# Patient Record
Sex: MALE | Race: BLACK OR AFRICAN AMERICAN | Employment: STUDENT | URBAN - METROPOLITAN AREA
[De-identification: names, ages, dates, MRNs, and addresses within clinical notes are randomized per-mention and may not be internally consistent; named-entity substitution may affect disease eponyms.]

---
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
## 2025-01-02 ENCOUNTER — TELEPHONE (OUTPATIENT)
Dept: PRIMARY CARE CLINIC | Age: 20
End: 2025-01-02

## 2025-01-02 DIAGNOSIS — F90.2 ATTENTION DEFICIT HYPERACTIVITY DISORDER (ADHD), COMBINED TYPE: ICD-10-CM

## 2025-01-02 RX ORDER — DEXMETHYLPHENIDATE HYDROCHLORIDE 40 MG/1
40 CAPSULE, EXTENDED RELEASE ORAL DAILY
Qty: 30 CAPSULE | Refills: 0 | Status: SHIPPED | OUTPATIENT
Start: 2025-01-02 | End: 2025-02-01

## 2025-01-02 NOTE — TELEPHONE ENCOUNTER
[facility] [id number] - [place], [place] - [address] [facility] - P [phone] - F [phone]   [address] [facility] [address]   Phone:  [phone]  Fax:  [phone]       Dexmethylphenidate HCl ER 40 MG CP24 [[id number]]  ENDED

## 2025-01-02 NOTE — TELEPHONE ENCOUNTER
Called patient mother CAROLYN and left detailed vm that  would like patient or parent to call and find a pharmacy that does have dexmethylphenidate 40mg in stock. Last [facility] did not have the 40mg caps in stock and  changed rx to 20mg caps quantity 60 but patient insurance refused to cover the 20mg caps, therefore  requests a pharmacy change for medication. Left detailed vm with office phone number as well as a [employer] message.

## 2025-01-02 NOTE — TELEPHONE ENCOUNTER
Patient mom called in, they are out of the 40mg. Can we go back to the 20mg doubled.     Please advise.

## 2025-01-02 NOTE — TELEPHONE ENCOUNTER
Medication:   Requested Prescriptions     Pending Prescriptions Disp Refills    Dexmethylphenidate HCl ER 40 MG CP24 30 capsule 0     Sig: Take 40 mg by mouth daily for 30 days. Max Daily Amount: 40 mg      Last Filled:  11/20/24  Patient Phone Number: [phone] (home)     Last appt: 9/11/2024   Next appt: 3/12/2025    Last OARRS:        No data to display              PDMP Monitoring:    Last PDMP Master as Reviewed ([place]):  Review User Review Instant Review Result   ELBERT BECERRA 11/19/2024 11:00 AM Reviewed PDMP [1]     Preferred Pharmacy:   [facility] [id number] [place], [place] - [address] [facility] [phone] - F [phone]  [address] [place] [address]  Phone: [phone] Fax: [phone]    Recent Visits  Date Type Provider Dept   09/11/24 Office Visit Elbert Becerra MD [facility]   01/23/24 Office Visit Elbert Becerra MD [facility]   Showing recent visits within past 540 days with a meds authorizing provider and meeting all other requirements  Future Appointments  Date Type Provider Dept   03/12/25 Appointment Elbert Becerra MD [facility]   Showing future appointments within next 150 days with a meds authorizing provider and meeting all other requirements     9/11/2024

## 2025-01-08 DIAGNOSIS — F32.5 MAJOR DEPRESSIVE DISORDER WITH SINGLE EPISODE, IN FULL REMISSION (HCC): ICD-10-CM

## 2025-01-08 NOTE — TELEPHONE ENCOUNTER
Medication:   Requested Prescriptions     Pending Prescriptions Disp Refills    sertraline (ZOLOFT) 50 MG tablet 135 tablet 0     Sig: Take 1.5 tablets by mouth daily      Last Filled:  9/11/24    Patient Phone Number: [phone] (home)     Last appt: 9/11/2024   Next appt: 3/12/2025    Last OARRS:        No data to display              PDMP Monitoring:    Last PDMP Master as Reviewed ([place]):  Review User Review Instant Review Result   ELBERT BECERRA 1/2/2025 12:05 PM Reviewed PDMP [1]     Preferred Pharmacy:   [facility] [id number] [place], [place] - [address] [facility] [phone] -  [phone]  [address] [place] [address]  Phone: [phone] Fax: [phone]    Recent Visits  Date Type Provider Dept   09/11/24 Office Visit Elbert Becerra MD [facility]   01/23/24 Office Visit Elbert Becerra MD [facility]   Showing recent visits within past 540 days with a meds authorizing provider and meeting all other requirements  Future Appointments  Date Type Provider Dept   03/12/25 Appointment Elbert Becerra MD [facility]   Showing future appointments within next 150 days with a meds authorizing provider and meeting all other requirements     9/11/2024

## 2025-02-18 ENCOUNTER — TELEPHONE (OUTPATIENT)
Dept: PRIMARY CARE CLINIC | Age: 20
End: 2025-02-18

## 2025-02-18 DIAGNOSIS — F90.2 ATTENTION DEFICIT HYPERACTIVITY DISORDER (ADHD), COMBINED TYPE: ICD-10-CM

## 2025-02-18 RX ORDER — DEXMETHYLPHENIDATE HYDROCHLORIDE 40 MG/1
40 CAPSULE, EXTENDED RELEASE ORAL DAILY
Qty: 30 CAPSULE | Refills: 0 | Status: SHIPPED | OUTPATIENT
Start: 2025-02-18 | End: 2025-03-20

## 2025-02-18 NOTE — TELEPHONE ENCOUNTER
Medication:   Requested Prescriptions     Pending Prescriptions Disp Refills    Dexmethylphenidate HCl ER 40 MG CP24 30 capsule 0     Sig: Take 40 mg by mouth daily for 30 days. Max Daily Amount: 40 mg      Last Filled:  1/2/25    Patient Phone Number: [phone] (home)     Last appt: 9/11/2024   Next appt: 3/12/2025    Last OARRS:        No data to display              PDMP Monitoring:    Last PDMP Master as Reviewed ([place]):  Review User Review Instant Review Result   ELBERT BECERRA 1/2/2025 12:05 PM Reviewed PDMP [1]     Preferred Pharmacy:   [facility] [id number] [place], [place] - [address] [facility] [phone] - F [phone]  [address] [place] [address]  Phone: [phone] Fax: [phone]    Recent Visits  Date Type Provider Dept   09/11/24 Office Visit Elbert Becerra MD [facility]   01/23/24 Office Visit Elbert Becerra MD [facility]   Showing recent visits within past 540 days with a meds authorizing provider and meeting all other requirements  Future Appointments  Date Type Provider Dept   03/12/25 Appointment Elbert Becerra MD [facility]   Showing future appointments within next 150 days with a meds authorizing provider and meeting all other requirements     9/11/2024

## 2025-02-18 NOTE — TELEPHONE ENCOUNTER
Mom is calling in a request for the below medication refill:    Dexmethylphenidate HCl ER 40 MG CP24 [[id number]]  ENDED      Thank you.

## 2025-03-12 ENCOUNTER — TELEPHONE (OUTPATIENT)
Dept: PRIMARY CARE CLINIC | Age: 20
End: 2025-03-12

## 2025-03-12 NOTE — TELEPHONE ENCOUNTER
Provider is out of office unexpectedly - LVM appointment is cancelled and patient will need to call or use MyChart to reschedule.

## 2025-03-19 ENCOUNTER — OFFICE VISIT (OUTPATIENT)
Dept: PRIMARY CARE CLINIC | Age: 20
End: 2025-03-19
Payer: COMMERCIAL

## 2025-03-19 VITALS
TEMPERATURE: 98.4 F | OXYGEN SATURATION: 98 % | DIASTOLIC BLOOD PRESSURE: 74 MMHG | BODY MASS INDEX: 31.04 KG/M2 | HEIGHT: 69 IN | SYSTOLIC BLOOD PRESSURE: 118 MMHG | WEIGHT: 209.6 LBS | HEART RATE: 91 BPM

## 2025-03-19 DIAGNOSIS — F90.2 ATTENTION DEFICIT HYPERACTIVITY DISORDER (ADHD), COMBINED TYPE: Primary | ICD-10-CM

## 2025-03-19 DIAGNOSIS — F32.5 MAJOR DEPRESSIVE DISORDER WITH SINGLE EPISODE, IN FULL REMISSION: ICD-10-CM

## 2025-03-19 PROCEDURE — G2211 COMPLEX E/M VISIT ADD ON: HCPCS | Performed by: STUDENT IN AN ORGANIZED HEALTH CARE EDUCATION/TRAINING PROGRAM

## 2025-03-19 PROCEDURE — 99214 OFFICE O/P EST MOD 30 MIN: CPT | Performed by: STUDENT IN AN ORGANIZED HEALTH CARE EDUCATION/TRAINING PROGRAM

## 2025-03-19 RX ORDER — DEXMETHYLPHENIDATE HYDROCHLORIDE 40 MG/1
40 CAPSULE, EXTENDED RELEASE ORAL DAILY
Qty: 30 CAPSULE | Refills: 0 | Status: SHIPPED | OUTPATIENT
Start: 2025-03-19 | End: 2025-04-18

## 2025-03-19 SDOH — ECONOMIC STABILITY: FOOD INSECURITY: WITHIN THE PAST 12 MONTHS, THE FOOD YOU BOUGHT JUST DIDN'T LAST AND YOU DIDN'T HAVE MONEY TO GET MORE.: NEVER TRUE

## 2025-03-19 SDOH — ECONOMIC STABILITY: FOOD INSECURITY: WITHIN THE PAST 12 MONTHS, YOU WORRIED THAT YOUR FOOD WOULD RUN OUT BEFORE YOU GOT MONEY TO BUY MORE.: NEVER TRUE

## 2025-03-19 ASSESSMENT — ENCOUNTER SYMPTOMS
DIARRHEA: 0
COUGH: 0
NAUSEA: 0
SHORTNESS OF BREATH: 0
VOMITING: 0
ABDOMINAL PAIN: 0

## 2025-03-19 ASSESSMENT — PATIENT HEALTH QUESTIONNAIRE - PHQ9
7. TROUBLE CONCENTRATING ON THINGS, SUCH AS READING THE NEWSPAPER OR WATCHING TELEVISION: NOT AT ALL
SUM OF ALL RESPONSES TO PHQ QUESTIONS 1-9: 0
6. FEELING BAD ABOUT YOURSELF - OR THAT YOU ARE A FAILURE OR HAVE LET YOURSELF OR YOUR FAMILY DOWN: NOT AT ALL
3. TROUBLE FALLING OR STAYING ASLEEP: NOT AT ALL
4. FEELING TIRED OR HAVING LITTLE ENERGY: NOT AT ALL
10. IF YOU CHECKED OFF ANY PROBLEMS, HOW DIFFICULT HAVE THESE PROBLEMS MADE IT FOR YOU TO DO YOUR WORK, TAKE CARE OF THINGS AT HOME, OR GET ALONG WITH OTHER PEOPLE: NOT DIFFICULT AT ALL
5. POOR APPETITE OR OVEREATING: NOT AT ALL
SUM OF ALL RESPONSES TO PHQ QUESTIONS 1-9: 0
2. FEELING DOWN, DEPRESSED OR HOPELESS: NOT AT ALL
8. MOVING OR SPEAKING SO SLOWLY THAT OTHER PEOPLE COULD HAVE NOTICED. OR THE OPPOSITE, BEING SO FIGETY OR RESTLESS THAT YOU HAVE BEEN MOVING AROUND A LOT MORE THAN USUAL: NOT AT ALL
9. THOUGHTS THAT YOU WOULD BE BETTER OFF DEAD, OR OF HURTING YOURSELF: NOT AT ALL
1. LITTLE INTEREST OR PLEASURE IN DOING THINGS: NOT AT ALL
SUM OF ALL RESPONSES TO PHQ QUESTIONS 1-9: 0
SUM OF ALL RESPONSES TO PHQ QUESTIONS 1-9: 0

## 2025-03-19 ASSESSMENT — ANXIETY QUESTIONNAIRES
1. FEELING NERVOUS, ANXIOUS, OR ON EDGE: NOT AT ALL
4. TROUBLE RELAXING: NOT AT ALL
3. WORRYING TOO MUCH ABOUT DIFFERENT THINGS: NOT AT ALL
6. BECOMING EASILY ANNOYED OR IRRITABLE: NOT AT ALL
7. FEELING AFRAID AS IF SOMETHING AWFUL MIGHT HAPPEN: NOT AT ALL
IF YOU CHECKED OFF ANY PROBLEMS ON THIS QUESTIONNAIRE, HOW DIFFICULT HAVE THESE PROBLEMS MADE IT FOR YOU TO DO YOUR WORK, TAKE CARE OF THINGS AT HOME, OR GET ALONG WITH OTHER PEOPLE: NOT DIFFICULT AT ALL
GAD7 TOTAL SCORE: 0
5. BEING SO RESTLESS THAT IT IS HARD TO SIT STILL: NOT AT ALL
2. NOT BEING ABLE TO STOP OR CONTROL WORRYING: NOT AT ALL

## 2025-03-19 NOTE — ASSESSMENT & PLAN NOTE
Stable  PHQ-9 Total Score: 0 (3/19/2025  1:51 PM)  Thoughts that you would be better off dead, or of hurting yourself in some way: 0 (3/19/2025  1:51 PM)  Plan  - Continue Zoloft 75 mg daily

## 2025-03-19 NOTE — PROGRESS NOTES
follow up.    I have spent 20 minutes reviewing previous notes, test results and face to face with the patient discussing the diagnosis and importance of compliance with the treatment plan as well as documenting on the day of the visit.    Electronically signed by Elbert Beckwith MD on 3/19/2025 at 2:08 PM     Please note, documentation for this visit was generated using dragon dictation software.  Although every effort was made to ensure accuracy; inadvertent, unintentional transcription errors may have occurred.

## 2025-03-27 ENCOUNTER — TELEPHONE (OUTPATIENT)
Dept: PRIMARY CARE CLINIC | Age: 20
End: 2025-03-27

## 2025-03-27 DIAGNOSIS — F90.2 ATTENTION DEFICIT HYPERACTIVITY DISORDER (ADHD), COMBINED TYPE: ICD-10-CM

## 2025-03-27 RX ORDER — DEXMETHYLPHENIDATE HYDROCHLORIDE 40 MG/1
40 CAPSULE, EXTENDED RELEASE ORAL DAILY
Qty: 30 CAPSULE | Refills: 0 | Status: SHIPPED | OUTPATIENT
Start: 2025-03-27 | End: 2025-04-26

## 2025-03-27 NOTE — TELEPHONE ENCOUNTER
Medication:   Requested Prescriptions     Pending Prescriptions Disp Refills    Dexmethylphenidate HCl ER 40 MG CP24 30 capsule 0     Sig: Take 40 mg by mouth daily for 30 days. Max Daily Amount: 40 mg      Last Filled:  3/19/25 called [facility] and canceled, resent to [facility]    Patient Phone Number: [phone] (home)     Last appt: 3/19/2025   Next appt: 9/19/2025    Last OARRS:        No data to display              PDMP Monitoring:    Last PDMP Master as Reviewed ([place]):  Review User Review Instant Review Result   GENIE BECERRA 3/19/2025  1:59 PM Reviewed PDMP [1]     Preferred Pharmacy:     [id number]  [address] [place] [address]

## 2025-03-27 NOTE — TELEPHONE ENCOUNTER
Patient is requesting the below medication be sent to [facility] and to make this his primary pharmacy. [facility] did not have it. I did explain in the future they will need to make sure the pharmacy has the medication in stock prior to the prescription being sent over.     Dexmethylphenidate HCl ER 40 MG CP24 [[id number]]      [facility]  [address]  [place] [id number]     Please reach out to the patient with any question or concerns, also he would like a call when the medication is sent to the pharmacy.     Thank you.

## 2025-03-27 NOTE — TELEPHONE ENCOUNTER
Called [facility] pharmacy [phone] to cancel refill for Dexmethylphenidate, spoke to Gladis who confirmed medication was canceled.     Resent refill to [facility] on [address] Per patients request.

## 2025-05-05 DIAGNOSIS — F90.2 ATTENTION DEFICIT HYPERACTIVITY DISORDER (ADHD), COMBINED TYPE: ICD-10-CM

## 2025-05-05 NOTE — TELEPHONE ENCOUNTER
[facility] is calling in to get the below mecication refilled for the patient.     Dexmethylphenidate HCl ER 40 MG CP24 [[id number]]  ENDED    Thank you.

## 2025-05-06 NOTE — TELEPHONE ENCOUNTER
Medication:   Requested Prescriptions     Pending Prescriptions Disp Refills    Dexmethylphenidate HCl ER 40 MG CP24 30 capsule 0     Sig: Take 40 mg by mouth daily for 30 days. Max Daily Amount: 40 mg      Last Filled:  3/27/25    Patient Phone Number: [phone] (home)     Last appt: 3/19/2025   Next appt: 9/19/2025    Last OARRS:        No data to display              PDMP Monitoring:    Last PDMP Master as Reviewed ([place]):  Review User Review Instant Review Result   ELBERT BECERRA 3/19/2025  1:59 PM Reviewed PDMP [1]     Preferred Pharmacy:   [id number] [place] [facility] [place] - [address] P [phone] - F [phone]  [address] [address]  Phone: [phone] Fax: [phone]    Recent Visits  Date Type Provider Dept   03/19/25 Office Visit Elbert Becerra MD [facility]   09/11/24 Office Visit Elbert Becerra MD [facility]   01/23/24 Office Visit Elbert Becerra MD [facility]   Showing recent visits within past 540 days with a meds authorizing provider and meeting all other requirements  Future Appointments  Date Type Provider Dept   09/19/25 Appointment Elbert Becerra MD [facility]   Showing future appointments within next 150 days with a meds authorizing provider and meeting all other requirements     3/19/2025

## 2025-05-07 ENCOUNTER — TELEPHONE (OUTPATIENT)
Dept: PRIMARY CARE CLINIC | Age: 20
End: 2025-05-07

## 2025-05-07 RX ORDER — DEXMETHYLPHENIDATE HYDROCHLORIDE 40 MG/1
40 CAPSULE, EXTENDED RELEASE ORAL DAILY
Qty: 30 CAPSULE | Refills: 0 | Status: SHIPPED | OUTPATIENT
Start: 2025-05-07 | End: 2025-06-06

## 2025-05-07 NOTE — TELEPHONE ENCOUNTER
Patients mother Alejandra called and expressing frustration that medication dexmethlphenidate has not been refilled  after she initially called in the request Monday 5/5/25.  Apologized and advised would send the message high priority, confirmed pharmacy as [facility]

## 2025-05-07 NOTE — TELEPHONE ENCOUNTER
Patient mother called in, wanted to get an update on his medication.   He is out of medication.     What is the suggested route for medication refills, he waits until the last moment and they would like to get ahead of it.

## 2025-05-29 ENCOUNTER — TELEPHONE (OUTPATIENT)
Dept: PRIMARY CARE CLINIC | Age: 20
End: 2025-05-29

## 2025-05-29 DIAGNOSIS — F90.2 ATTENTION DEFICIT HYPERACTIVITY DISORDER (ADHD), COMBINED TYPE: ICD-10-CM

## 2025-05-29 RX ORDER — DEXMETHYLPHENIDATE HYDROCHLORIDE 40 MG/1
40 CAPSULE, EXTENDED RELEASE ORAL DAILY
Qty: 30 CAPSULE | Refills: 0 | Status: CANCELLED | OUTPATIENT
Start: 2025-05-29 | End: 2025-06-28

## 2025-06-03 ENCOUNTER — TELEPHONE (OUTPATIENT)
Dept: PRIMARY CARE CLINIC | Age: 20
End: 2025-06-03

## 2025-06-03 DIAGNOSIS — F90.2 ATTENTION DEFICIT HYPERACTIVITY DISORDER (ADHD), COMBINED TYPE: ICD-10-CM

## 2025-06-03 NOTE — TELEPHONE ENCOUNTER
Alejandra Ben's mom called and she would like Ben's refill of his Dexmethylphenidate. He is going out of town at the end of the week.

## 2025-06-04 RX ORDER — DEXMETHYLPHENIDATE HYDROCHLORIDE 40 MG/1
40 CAPSULE, EXTENDED RELEASE ORAL DAILY
Qty: 30 CAPSULE | Refills: 0 | Status: SHIPPED | OUTPATIENT
Start: 2025-06-04 | End: 2025-07-04

## 2025-06-04 NOTE — TELEPHONE ENCOUNTER
Medication:   Requested Prescriptions     Pending Prescriptions Disp Refills    Dexmethylphenidate HCl ER 40 MG CP24 30 capsule 0     Sig: Take 40 mg by mouth daily for 30 days. Max Daily Amount: 40 mg      Last Filled:  5/7/25    Patient Phone Number: [phone] (home)     Last appt: 3/19/2025   Next appt: 9/19/2025    Last OARRS:        No data to display              PDMP Monitoring:    Last PDMP Master as Reviewed ([place]):  Review User Review Instant Review Result   ELBERT BECERRA 5/7/2025 10:55 AM Reviewed PDMP [1]     Preferred Pharmacy:   [id number] [place] - [address] P [phone] - F [phone]  [address] [address]  Phone: [phone] Fax: [phone]    Recent Visits  Date Type Provider Dept   03/19/25 Office Visit Elbert Becerra MD [facility]   09/11/24 Office Visit [facility] Becerra [facility]   01/23/24 Office Visit [facility]bert Becerra MD [facility]   Showing recent visits within past 540 days with a meds authorizing provider and meeting all other requirements  Future Appointments  Date Type Provider Dept   09/19/25 Appointment Elbert Becerra MD [facility]   Showing future appointments within next 150 days with a meds authorizing provider and meeting all other requirements     3/19/2025

## 2025-07-10 DIAGNOSIS — F90.2 ATTENTION DEFICIT HYPERACTIVITY DISORDER (ADHD), COMBINED TYPE: ICD-10-CM

## 2025-07-10 RX ORDER — DEXMETHYLPHENIDATE HYDROCHLORIDE 40 MG/1
40 CAPSULE, EXTENDED RELEASE ORAL DAILY
Qty: 30 CAPSULE | Refills: 0 | Status: SHIPPED | OUTPATIENT
Start: 2025-07-10 | End: 2025-08-09

## 2025-07-10 NOTE — TELEPHONE ENCOUNTER
[facility] #[id number] - [place], [place] - [address] - P [phone] - F [phone]   [address] [place] [address]   Phone:  [phone]  Fax:  [phone]       Dexmethylphenidate HCl ER 40 MG CP24 [[id number]]  ENDED    Patient is out of meds.

## 2025-08-07 DIAGNOSIS — F32.5 MAJOR DEPRESSIVE DISORDER WITH SINGLE EPISODE, IN FULL REMISSION: ICD-10-CM

## 2025-08-13 ENCOUNTER — TELEPHONE (OUTPATIENT)
Dept: PRIMARY CARE CLINIC | Age: 20
End: 2025-08-13

## 2025-08-13 DIAGNOSIS — F90.2 ATTENTION DEFICIT HYPERACTIVITY DISORDER (ADHD), COMBINED TYPE: ICD-10-CM

## 2025-08-13 RX ORDER — DEXMETHYLPHENIDATE HYDROCHLORIDE 40 MG/1
40 CAPSULE, EXTENDED RELEASE ORAL DAILY
Qty: 30 CAPSULE | Refills: 0 | Status: SHIPPED | OUTPATIENT
Start: 2025-08-13 | End: 2025-09-12